# Patient Record
Sex: FEMALE | URBAN - METROPOLITAN AREA
[De-identification: names, ages, dates, MRNs, and addresses within clinical notes are randomized per-mention and may not be internally consistent; named-entity substitution may affect disease eponyms.]

---

## 2022-12-21 ENCOUNTER — OFFICE VISIT (OUTPATIENT)
Dept: DENTISTRY | Facility: CLINIC | Age: 47
End: 2022-12-21

## 2022-12-21 VITALS — SYSTOLIC BLOOD PRESSURE: 131 MMHG | HEART RATE: 70 BPM | DIASTOLIC BLOOD PRESSURE: 76 MMHG | TEMPERATURE: 98.7 F

## 2022-12-21 DIAGNOSIS — Z01.20 ENCOUNTER FOR DENTAL EXAMINATION: Primary | ICD-10-CM

## 2022-12-22 NOTE — PROGRESS NOTES
Comprehensive Dental exam   Pt presents to [address] for initial data collection visit  H&P updated and vitals recorded  Pt states no pain or swelling at this time  Chief Concern: I am here for a check up    Reviewed Health History  ASA: I   Consents signed: YES    Radiographs taken  Verbal consent to take radiographs given  Oral Cancer Screening Completed  WNL     Clinical assessment:  EOE: WNL facial symmetry, no lymphadenopathy   TMJ: WNL    IOE:   Soft tissue exam: lips and labial mucosa, buccal and vestibular mucosa, gingiva and alveolar mucosa, hard and soft palate, oropharynx, tongue, and floor of mouth all WNL - no detectable pathology   Hard tissue exam: permanent dentition with multiple restorations with recurrent decay with heavy plaque and calculus accumulation  OH: poor - visible heavy generalized plaque accumulation heavy bleeding present, staining present, calculus present     Comprehensive periodontal charting could not be completed due to heavy calculus interproximally  Spot probings were attempted and heavy calculus was felt clinically and presented radiographically  Full Mouth debridement is indicated  Restorative check completed  Radiographs reviewed  Restorative findings: Updated on the tooth chart   Caries Assessment: HIGH     Pt was informed that due to complexity of the case, it will take several appointments to finalize the treatment plan and pt will be scheduled for a data collection session  Tentative plan: Infection control: Full mouth debridement   Periodontal therapy: SRP (SRP re-evaluation, perio maintenance), F varnish, OHI  Caries Control:  Restorative TxP:  #12 Post+core, crown  #13 Crown ( existing MOD with recurrent decay)  #28 DO - subgingival caries/root caries  #31 crown (existing MOB with recurrent decay)     Discussed risks and benefits of treatment vs no treatment  OHI provided   (OHI - Reviewed proper brushing and flossing techniques and frequency  Demonstrated use of floss  Recommended Listerine/F mouthwash )   Hygiene Recall Visits recommended to the patient  Patient left satisfied and ambulatory      NV: Full mouth debridement